# Patient Record
Sex: MALE | Employment: FULL TIME | URBAN - METROPOLITAN AREA
[De-identification: names, ages, dates, MRNs, and addresses within clinical notes are randomized per-mention and may not be internally consistent; named-entity substitution may affect disease eponyms.]

---

## 2022-06-30 ENCOUNTER — OFFICE VISIT (OUTPATIENT)
Dept: UROLOGY | Facility: MEDICAL CENTER | Age: 67
End: 2022-06-30
Payer: COMMERCIAL

## 2022-06-30 VITALS
HEART RATE: 78 BPM | WEIGHT: 244.6 LBS | HEIGHT: 74 IN | BODY MASS INDEX: 31.39 KG/M2 | SYSTOLIC BLOOD PRESSURE: 140 MMHG | OXYGEN SATURATION: 98 % | DIASTOLIC BLOOD PRESSURE: 86 MMHG

## 2022-06-30 DIAGNOSIS — N40.0 BPH WITHOUT OBSTRUCTION/LOWER URINARY TRACT SYMPTOMS: ICD-10-CM

## 2022-06-30 DIAGNOSIS — N52.8 MIXED ERECTILE DYSFUNCTION: Primary | ICD-10-CM

## 2022-06-30 PROCEDURE — 99204 OFFICE O/P NEW MOD 45 MIN: CPT | Performed by: UROLOGY

## 2022-06-30 RX ORDER — AMLODIPINE BESYLATE 5 MG/1
5 TABLET ORAL DAILY
COMMUNITY
Start: 2021-08-30 | End: 2022-09-26

## 2022-06-30 RX ORDER — ATORVASTATIN CALCIUM 20 MG/1
20 TABLET, FILM COATED ORAL DAILY
COMMUNITY

## 2022-06-30 RX ORDER — LISINOPRIL 20 MG/1
20 TABLET ORAL EVERY 12 HOURS
COMMUNITY
Start: 2021-08-30 | End: 2022-09-26

## 2022-06-30 RX ORDER — MEXILETINE HYDROCHLORIDE 200 MG/1
200 CAPSULE ORAL EVERY 12 HOURS
COMMUNITY

## 2022-06-30 NOTE — PROGRESS NOTES
HISTORY:    Long history of ED  Was on Viagra for many years, that stopped working as well and about four years ago, then switch to Cialis  That work fairly well until the past year or so    He now reports less interest in sex, no morning time erections when he awakens up, and very unreliable results with Cialis  Will sometimes get the erection, but does not last very long  Blood test testosterone normal four years ago, PSA quite low at that time  Reports no urinary or prostate issues         ASSESSMENT / PLAN:    1  We discussed injections, implant, others techniques    Says he is not interested in those techniques    2  Will check testosterone again, in PSA  3  He knows if Cialis is not work much anymore, next step would be injections if he were interested The following portions of the patient's history were reviewed and updated as appropriate: allergies, current medications, past family history, past medical history, past social history, past surgical history and problem list     Review of Systems   All other systems reviewed and are negative  Objective:     Physical Exam  Constitutional:       General: He is not in acute distress  Appearance: He is well-developed  He is not diaphoretic  HENT:      Head: Normocephalic and atraumatic  Eyes:      General: No scleral icterus  Pulmonary:      Effort: Pulmonary effort is normal    Genitourinary:     Comments: Penis testes normal    Prostate minimally enlarged no nodules  Skin:     Coloration: Skin is not pale  Neurological:      Mental Status: He is alert and oriented to person, place, and time  Psychiatric:         Behavior: Behavior normal          Thought Content: Thought content normal          Judgment: Judgment normal            No results found for: PSA]  No results found for: BUN  No results found for: CREATININE  No components found for: CBC      There is no problem list on file for this patient         Diagnoses and all orders for this visit:    Mixed erectile dysfunction  -     Testosterone; Future    BPH without obstruction/lower urinary tract symptoms  -     PSA Total, Diagnostic; Future    Other orders  -     amLODIPine (NORVASC) 5 mg tablet; Take 5 mg by mouth daily  -     atorvastatin (LIPITOR) 20 mg tablet; Take 20 mg by mouth daily  -     lisinopril (ZESTRIL) 20 mg tablet; Take 20 mg by mouth every 12 (twelve) hours  -     mexiletine (MEXITIL) 200 MG capsule; Take 200 mg by mouth every 12 (twelve) hours           Patient ID: Delano Santoro is a 69 y o  male  Current Outpatient Medications:     amLODIPine (NORVASC) 5 mg tablet, Take 5 mg by mouth daily, Disp: , Rfl:     atorvastatin (LIPITOR) 20 mg tablet, Take 20 mg by mouth daily, Disp: , Rfl:     lisinopril (ZESTRIL) 20 mg tablet, Take 20 mg by mouth every 12 (twelve) hours, Disp: , Rfl:     mexiletine (MEXITIL) 200 MG capsule, Take 200 mg by mouth every 12 (twelve) hours, Disp: , Rfl:     Past Medical History:   Diagnosis Date    Erectile dysfunction     High cholesterol 2012    Hypertension     Hypothyroid 2000       No past surgical history on file      Social History